# Patient Record
Sex: FEMALE | Race: OTHER | HISPANIC OR LATINO | URBAN - METROPOLITAN AREA
[De-identification: names, ages, dates, MRNs, and addresses within clinical notes are randomized per-mention and may not be internally consistent; named-entity substitution may affect disease eponyms.]

---

## 2017-11-25 ENCOUNTER — EMERGENCY (EMERGENCY)
Facility: HOSPITAL | Age: 27
LOS: 1 days | Discharge: HOME | End: 2017-11-25
Attending: EMERGENCY MEDICINE
Payer: MEDICAID

## 2017-11-25 VITALS
RESPIRATION RATE: 16 BRPM | DIASTOLIC BLOOD PRESSURE: 74 MMHG | WEIGHT: 113.1 LBS | TEMPERATURE: 99 F | HEIGHT: 62.6 IN | OXYGEN SATURATION: 99 % | HEART RATE: 84 BPM | SYSTOLIC BLOOD PRESSURE: 129 MMHG

## 2017-11-25 PROCEDURE — 99283 EMERGENCY DEPT VISIT LOW MDM: CPT

## 2017-11-25 PROCEDURE — T1013: CPT

## 2017-11-25 RX ORDER — VALACYCLOVIR 500 MG/1
1 TABLET, FILM COATED ORAL
Qty: 21 | Refills: 0
Start: 2017-11-25 | End: 2017-12-02

## 2017-11-25 RX ORDER — PREDNISOLONE 5 MG
1 TABLET ORAL
Qty: 7 | Refills: 0
Start: 2017-11-25 | End: 2017-12-02

## 2017-11-25 NOTE — ED ADULT TRIAGE NOTE - CHIEF COMPLAINT QUOTE
PAtient reports insect bite on back 2 days ago. Worsening and now another irritated patch is on chest.

## 2017-11-25 NOTE — ED STATDOCS - OBJECTIVE STATEMENT
28 y/o F pt presents to the ED with c/o pustular rash on chest and back. Pt is also experiencing itchiness and says its progressively worsening. Denies pain, fever, chills. No further complaints at this time.       : Carmela

## 2018-09-11 PROBLEM — Z00.00 ENCOUNTER FOR PREVENTIVE HEALTH EXAMINATION: Status: ACTIVE | Noted: 2018-09-11

## 2018-09-27 ENCOUNTER — OTHER (OUTPATIENT)
Age: 28
End: 2018-09-27

## 2018-10-11 ENCOUNTER — APPOINTMENT (OUTPATIENT)
Dept: ORTHOPEDIC SURGERY | Facility: CLINIC | Age: 28
End: 2018-10-11
Payer: MEDICAID

## 2018-10-11 VITALS
HEIGHT: 62 IN | DIASTOLIC BLOOD PRESSURE: 80 MMHG | HEART RATE: 65 BPM | SYSTOLIC BLOOD PRESSURE: 118 MMHG | WEIGHT: 120 LBS | BODY MASS INDEX: 22.08 KG/M2

## 2018-10-11 DIAGNOSIS — Z80.1 FAMILY HISTORY OF MALIGNANT NEOPLASM OF TRACHEA, BRONCHUS AND LUNG: ICD-10-CM

## 2018-10-11 PROCEDURE — 99203 OFFICE O/P NEW LOW 30 MIN: CPT

## 2018-10-11 PROCEDURE — 73564 X-RAY EXAM KNEE 4 OR MORE: CPT | Mod: RT

## 2018-10-19 ENCOUNTER — APPOINTMENT (OUTPATIENT)
Dept: ORTHOPEDIC SURGERY | Facility: CLINIC | Age: 28
End: 2018-10-19

## 2019-10-03 ENCOUNTER — OTHER (OUTPATIENT)
Age: 29
End: 2019-10-03

## 2019-10-08 ENCOUNTER — APPOINTMENT (OUTPATIENT)
Dept: ORTHOPEDIC SURGERY | Facility: CLINIC | Age: 29
End: 2019-10-08
Payer: COMMERCIAL

## 2019-10-08 VITALS
HEART RATE: 71 BPM | HEIGHT: 62 IN | DIASTOLIC BLOOD PRESSURE: 71 MMHG | WEIGHT: 120 LBS | SYSTOLIC BLOOD PRESSURE: 112 MMHG | BODY MASS INDEX: 22.08 KG/M2

## 2019-10-08 PROCEDURE — 73564 X-RAY EXAM KNEE 4 OR MORE: CPT

## 2019-10-08 PROCEDURE — 99213 OFFICE O/P EST LOW 20 MIN: CPT

## 2019-10-08 NOTE — PHYSICAL EXAM
[de-identified] : The patient appears well nourished  and in no apparent distress.  The patient is alert and oriented to person, place, and time.   Affect and mood appear normal. The head is normocephalic and atraumatic.  The eyes reveal normal sclera and extra ocular muscles are intact. The tongue is midline with no apparent lesions.  Skin shows normal turgor with no evidence of eczema or psoriasis.  No respiratory distress noted.  Sensation grossly intact.\par   [de-identified] : Xray- 4 views of the right knee shows patella juan. [de-identified] : Exam of the right knee shows a positive J sign with patella subluxation when actively extending the knee.  5/5 motor strength bilaterally distally. Sensation intact distally.

## 2019-10-08 NOTE — REASON FOR VISIT
[Follow-Up Visit] : a follow-up visit for [Other: ____] : [unfilled] [[employer] ] : provided by [employer]   [TWNoteComboBox1] : [place]er

## 2019-10-08 NOTE — DISCUSSION/SUMMARY
[de-identified] : The patient is a 29 year old female with recurrent patella instability and patella juan on radiographs.  I think she might benefit from patellar realignment surgery and she was referred to my partner to discuss this treatment option further.

## 2019-10-08 NOTE — HISTORY OF PRESENT ILLNESS
[de-identified] : The patient is a 29 year old female being seen for evaluation of her right knee. Last seen in the office in October of last year at which time she was referred to a sport surgeon for evaluation of her right knee for patella instability. She reports seeing another doctor to discuss surgery options, but not having it covered by insurance. She reports feeling of subluxation of the right knee patella 2-3 times a week. She is ambulating and transferring well, but reports feelings of instability. She reports difficulty with stair-climbing and activity. She comes in today for repeat evaluation of the right knee.

## 2019-10-08 NOTE — ADDENDUM
[FreeTextEntry1] : This note was authored by Geovanny Resendiz working as a medical scribe for Dr. Gabriel Moctezuma. The note was reviewed, edited, and revised by Dr. Gabriel Moctezuma whom is in agreement with the exam findings, imaging findings, and treatment plan. 10/08/2019.

## 2019-10-22 ENCOUNTER — APPOINTMENT (OUTPATIENT)
Dept: ORTHOPEDIC SURGERY | Facility: CLINIC | Age: 29
End: 2019-10-22
Payer: COMMERCIAL

## 2019-10-22 ENCOUNTER — TRANSCRIPTION ENCOUNTER (OUTPATIENT)
Age: 29
End: 2019-10-22

## 2019-10-22 VITALS
HEART RATE: 79 BPM | WEIGHT: 120 LBS | BODY MASS INDEX: 22.08 KG/M2 | HEIGHT: 62 IN | DIASTOLIC BLOOD PRESSURE: 80 MMHG | SYSTOLIC BLOOD PRESSURE: 122 MMHG

## 2019-10-22 PROCEDURE — 73562 X-RAY EXAM OF KNEE 3: CPT | Mod: RT

## 2019-10-22 PROCEDURE — 99213 OFFICE O/P EST LOW 20 MIN: CPT

## 2019-10-31 NOTE — REASON FOR VISIT
[Initial Visit] : an initial visit for [[employer] ] : provided by [employer]   [FreeTextEntry1] : [id number] [FreeTextEntry2] : [place] [TWNoteComboBox1] : Afghan

## 2019-10-31 NOTE — DISCUSSION/SUMMARY
[de-identified] : Sherice is a 29-year-old female with right knee pain as well as patellar instability likely secondary to significant underlying problems. She has a positive J. sign as well as chronic instability of the knee over the past 20 years. 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 On the next visit I will also get full leg length x-rays to assess her mechanical and anatomic alignment. This is all necessary for preoperative planning and she will need possible staged procedures and possible combined proximal and distal realignment procedures. She agrees with the above plan and all questions were answered.

## 2019-10-31 NOTE — PHYSICAL EXAM
[de-identified] : Physical Exam:\par General: Well appearing, no acute distress, A&O\par Neurologic: A&Ox3, No focal deficits\par Head: NCAT without abrasions, lacerations, or ecchymosis to head, face, or scalp\par Eyes: No scleral icterus, no gross abnormalities\par Respiratory: Equal chest wall expansion bilaterally, no accessory muscle use\par Lymphatic: No lymphadenopathy palpated\par Skin: Warm and dry\par Psychiatric: Normal mood and affect\par Left knee\par \par Inspection/Palpation: There is no inguinal adenopathy. Well perfused, without skin lesions, shows a grossly normal motor and sensory examination. \par ROM: Normal\par Muscle/Nerves: Quad strength decreased secondary to injury. Motor exam 5/ distally, EHL/FHL/GSC/TA\par SILT L4-S1\par Special Tests: \par No Joint line tenderness noted  at medial and lateral joint line at 0 and 90 degrees. \par Stable in varus and valgus stress at 0 and 30 degrees\par Negative posterior drawer. \par Negative anterior drawer and stable Lachman \par Normal hip and ankle exam.\par \par \par Right Knee\par \par Inspection/Palpation: There is no inguinal adenopathy. Well perfused, without skin lesions, shows a grossly normal motor and sensory examination. Patella sitting laterally with full extension. Positive J. sign. Positive patellar apprehension. Increased patellar mobility.\par ROM: Normal\par Muscle/Nerves: Quad strength decreased secondary to injury. Motor exam 5/ distally, EHL/FHL/GSC/TA\par SILT L4-S1\par Special Tests: \par No Joint line tenderness noted  at medial and lateral joint line at 0 and 90 degrees. \par Stable in varus and valgus stress at 0 and 30 degrees\par Negative posterior drawer. \par Negative anterior drawer and stable Lachman \par Normal hip and ankle exam.\par  [de-identified] : MRI performed at outside was unavailable for me to review. I wasn't able to redo her notes nor is he reports or leg images. 4 views of the right knee were performed today and available for me to review. They demonstrate a laterally subluxed patella with full extension and a concentric patella in the trochlear groove with flexion. Minimal joint space narrowing noted to

## 2019-10-31 NOTE — HISTORY OF PRESENT ILLNESS
[2] : a current pain level of 2/10 [___ days] : [unfilled] day(s) ago [1] : an average pain level of 1/10 [None] : No exacerbating factors are noted [de-identified] : JERROD is a 29 year old female who presents today for evaluation of right patellar dislocations.  Her first dislocation occurred approximately 20 years ago and it now dislocates 2x/month.  She performed PT for one year several years ago which helped slightly.  MRI was obtained from [facility] but she does not have the images with her today.  She saw Dr. Moctezuma who referred her here for further evaluation.\par \par Currently, she denies pain to her knee or LE.  When her patella dislocates, she endorses pain inferior and medial to her patella.  She is able to reduce her patella on her own.  She denies numbness or tingling to the LE.

## 2019-10-31 NOTE — ADDENDUM
[FreeTextEntry1] : Regarding pts knee dislocations she has had them for over 20 years. 12 years ago she performed PT for 1 1/2 years of which gave her no relief. Over the last 8 months she has performed HEP daily with use of knee brace during all activities. She admits to over the last 11 years 1 knee dislocation per month. Over the last year it has increased to 2 or more dislocations per month with simple activities of daily living. She has stopped all sports/ recreational activities due to this and would like to get back to her normal activities. She admits to an MRI through [facility] in [place] of which she has yet to obtain results from. Educated pt we need the disc and report from this study and we will further require a CT scan for pre-operative planning to evaluate the bony alignment within her knee. MRI for cartilage and MPFL evaluation. Spoke with pts insurance who approved a new MRI to right knee already since other was old and prior to other multiple dislocations since then (approval #: [id number]) and discussed with Dr. Jones for approval of CT scan to right knee which he approved today (approval #: [id number]). Educated pt by  phone that we would like her to perform these studies, make sure we have results and then see us to discuss them. ( #: [id number], Macedonian). She agrees with the above plan and all questions were answered. VIDAL

## 2019-11-01 ENCOUNTER — FORM ENCOUNTER (OUTPATIENT)
Age: 29
End: 2019-11-01

## 2019-11-02 ENCOUNTER — APPOINTMENT (OUTPATIENT)
Dept: CT IMAGING | Facility: CLINIC | Age: 29
End: 2019-11-02

## 2019-11-02 ENCOUNTER — APPOINTMENT (OUTPATIENT)
Dept: MRI IMAGING | Facility: CLINIC | Age: 29
End: 2019-11-02

## 2019-11-02 ENCOUNTER — OUTPATIENT (OUTPATIENT)
Dept: OUTPATIENT SERVICES | Facility: HOSPITAL | Age: 29
LOS: 1 days | End: 2019-11-02
Payer: COMMERCIAL

## 2019-11-02 DIAGNOSIS — M25.569 PAIN IN UNSPECIFIED KNEE: ICD-10-CM

## 2019-11-02 DIAGNOSIS — S83.001A UNSPECIFIED SUBLUXATION OF RIGHT PATELLA, INITIAL ENCOUNTER: ICD-10-CM

## 2019-11-02 DIAGNOSIS — Z00.00 ENCOUNTER FOR GENERAL ADULT MEDICAL EXAMINATION WITHOUT ABNORMAL FINDINGS: ICD-10-CM

## 2019-11-02 PROCEDURE — 73700 CT LOWER EXTREMITY W/O DYE: CPT

## 2019-11-02 PROCEDURE — 73721 MRI JNT OF LWR EXTRE W/O DYE: CPT

## 2019-11-02 PROCEDURE — 73700 CT LOWER EXTREMITY W/O DYE: CPT | Mod: 26,RT

## 2019-11-02 PROCEDURE — 73721 MRI JNT OF LWR EXTRE W/O DYE: CPT | Mod: 26,RT

## 2019-12-23 ENCOUNTER — APPOINTMENT (OUTPATIENT)
Dept: ORTHOPEDIC SURGERY | Facility: CLINIC | Age: 29
End: 2019-12-23
Payer: COMMERCIAL

## 2019-12-23 VITALS
HEIGHT: 62 IN | WEIGHT: 120 LBS | BODY MASS INDEX: 22.08 KG/M2 | HEART RATE: 62 BPM | SYSTOLIC BLOOD PRESSURE: 114 MMHG | DIASTOLIC BLOOD PRESSURE: 75 MMHG

## 2019-12-23 PROCEDURE — 99213 OFFICE O/P EST LOW 20 MIN: CPT

## 2019-12-23 PROCEDURE — 77073 BONE LENGTH STUDIES: CPT

## 2019-12-23 NOTE — DISCUSSION/SUMMARY
[de-identified] : Sherice is a 29-year-old female with right knee pain secondary to chronic recurrent patella dislocation.  We reviewed the MRI and the CT scan today and discussed with her all options.  We discussed nonoperative care with continued patella stabilizing brace and PT.  We discussed operative intervention which may necessitate proximal versus distal versus combined realignment procedures.  I reviewed her leg length x-rays today as well which appeared to be normal.  We discussed success rates of proximal and distal realignment.  Her TT TG and her TT PCL are both within normal limits although she does have patella juan, cartilage damage underneath the patella, and a laterally subluxated patella.  Given all of her anatomic factors I do recommend distal realignment with a possible combined proximal realignment.  She would like to go home to think about proximal versus distal realignment.  I gave her a packet that discusses the pros and cons as well as the recovery time for both.  I will see her back in 2 weeks to further discuss surgical options.  She agrees with the above plan and all questions were answered.

## 2019-12-23 NOTE — HISTORY OF PRESENT ILLNESS
[___ days] : [unfilled] day(s) ago [2] : a current pain level of 2/10 [1] : a maximum pain level of 1/10 [None] : No relieving factors are noted [de-identified] : JERROD is a 29 year old female who presents today for evaluation of right patellar dislocations.  Her first dislocation occurred approximately 20 years ago and it now dislocates 2x/month.  She performed PT for one year several years ago which helped slightly.  MRI was obtained from [facility] but she does not have the images with her today.  She saw Dr. Moctezuma who referred her here for further evaluation.\par \par Currently, she denies pain to her knee or LE. She reports 2 patella dislocations within the last 3 weeks.  The knee brace helps, but it does occasionally dislocate while wearing the brace.  She obtained a CT scan and MRI. \par \par CT reveals: \par Findings consistent with chronic transient patellar dislocation with lateral subluxation of the patella, \par distal aspect trochlea, attenuated medial retinaculum and old fracture deformity of the inferior \par medial patella with healing.\par \par MRI reveals:\par Findings consistent with chronic transient patellar dislocation with chronically laterally subluxed \par patella, chronic attenuation and tearing of the medial retinaculum, and high-grade cartilage loss at \par the median patellar eminence extending to the medial patellar facet.\par

## 2019-12-23 NOTE — REASON FOR VISIT
[Follow-Up Visit] : a follow-up visit for [[employer] ] : provided by [employer]   [FreeTextEntry1] : [id number] [FreeTextEntry2] : Julian [TWNoteComboBox1] : Uzbek

## 2019-12-23 NOTE — PHYSICAL EXAM
[de-identified] : Physical Exam:\par General: Well appearing, no acute distress, A&O\par Neurologic: A&Ox3, No focal deficits\par Head: NCAT without abrasions, lacerations, or ecchymosis to head, face, or scalp\par Eyes: No scleral icterus, no gross abnormalities\par Respiratory: Equal chest wall expansion bilaterally, no accessory muscle use\par Lymphatic: No lymphadenopathy palpated\par Skin: Warm and dry\par Psychiatric: Normal mood and affect\par Left knee\par \par Inspection/Palpation: There is no inguinal adenopathy. Well perfused, without skin lesions, shows a grossly normal motor and sensory examination. \par ROM: Normal\par Muscle/Nerves: Quad strength decreased secondary to injury. Motor exam 5/ distally, EHL/FHL/GSC/TA\par SILT L4-S1\par Special Tests: \par No Joint line tenderness noted  at medial and lateral joint line at 0 and 90 degrees. \par Stable in varus and valgus stress at 0 and 30 degrees\par Negative posterior drawer. \par Negative anterior drawer and stable Lachman \par Normal hip and ankle exam.\par \par \par Right Knee\par \par Inspection/Palpation: There is no inguinal adenopathy. Well perfused, without skin lesions, shows a grossly normal motor and sensory examination. Patella sitting laterally with full extension. Positive J. sign. Positive patellar apprehension. Increased patellar mobility.\par ROM: Normal\par Muscle/Nerves: Quad strength decreased secondary to injury. Motor exam 5/ distally, EHL/FHL/GSC/TA\par SILT L4-S1\par Special Tests: \par No Joint line tenderness noted  at medial and lateral joint line at 0 and 90 degrees. \par Stable in varus and valgus stress at 0 and 30 degrees\par Negative posterior drawer. \par Negative anterior drawer and stable Lachman \par Normal hip and ankle exam.\par  [de-identified] : \par \par MRI and CT scan were performed at a [place] facility and the images and the report were available for me to review.\par MRI: \par Findings consistent with chronic transient patellar dislocation with chronically laterally subluxed \par patella, chronic attenuation and tearing of the medial retinaculum, and high-grade cartilage loss at \par the median patellar eminence extending to the medial patellar facet.TT-PCL measured 22\par \par CT: \par Findings consistent with chronic transient patellar dislocation with lateral subluxation of the patella, \par distal aspect trochlea, attenuated medial retinaculum and old fracture deformity of the inferior \par medial patella with healing. TT-TG measured 16\par \par Full leg length x-rays performed today and available for me to review.  They demonstrate a normal Q angle.

## 2020-01-06 ENCOUNTER — APPOINTMENT (OUTPATIENT)
Dept: ORTHOPEDIC SURGERY | Facility: CLINIC | Age: 30
End: 2020-01-06
Payer: COMMERCIAL

## 2020-01-06 VITALS
DIASTOLIC BLOOD PRESSURE: 73 MMHG | BODY MASS INDEX: 22.08 KG/M2 | SYSTOLIC BLOOD PRESSURE: 111 MMHG | HEART RATE: 54 BPM | HEIGHT: 62 IN | WEIGHT: 120 LBS

## 2020-01-06 DIAGNOSIS — M25.569 PAIN IN UNSPECIFIED KNEE: ICD-10-CM

## 2020-01-06 PROCEDURE — 99213 OFFICE O/P EST LOW 20 MIN: CPT

## 2020-01-06 NOTE — DISCUSSION/SUMMARY
[de-identified] : He comes in for follow-up today and to discuss surgical options.  I discussed with her in the last visit proximal versus distal realignment procedures.  After thorough discussion with the patient she elects for a proximal realignment.  I discussed with her the risks and benefits of proximal versus distal as well as the pros and cons.  I explained to her that a proximal realignment may not solve all of her issues and may potentially lead to a secondary surgery with a distal realignment.  She understands those risks and is willing to take them.  All risks, benefits and alternatives to the proposed surgical procedure, right knee arthroscopy with MPFL reconstruction with allograft, were discussed in great detail with the patient. Risks include but are not limited to pain, bleeding, infection, stiffness, [default value], medical complications (including DVT, PE, MI), and risks of anesthesia. The patient expressed understanding and all questions were answered. The patient is electing to proceed, and will have the patient scheduled accordingly.

## 2020-01-06 NOTE — HISTORY OF PRESENT ILLNESS
[2] : a current pain level of 2/10 [___ days] : [unfilled] day(s) ago [1] : a maximum pain level of 1/10 [None] : No relieving factors are noted [de-identified] : JERROD is a 29 year old female who presents today for evaluation of right patellar dislocations.  Her first dislocation occurred approximately 20 years ago and it now dislocates 2x/month.  She performed PT for one year several years ago which helped slightly.  MRI was obtained from [facility] but she does not have the images with her today.  She saw Dr. Moctezuma who referred her here for further evaluation.\par \par The knee brace helps, but it does occasionally dislocate while wearing the brace.  She obtained a CT scan and MRI. \par \par CT reveals: \par Findings consistent with chronic transient patellar dislocation with lateral subluxation of the patella, \par distal aspect trochlea, attenuated medial retinaculum and old fracture deformity of the inferior \par medial patella with healing.\par \par MRI reveals:\par Findings consistent with chronic transient patellar dislocation with chronically laterally subluxed \par patella, chronic attenuation and tearing of the medial retinaculum, and high-grade cartilage loss at \par the median patellar eminence extending to the medial patellar facet.\par

## 2020-01-06 NOTE — REASON FOR VISIT
[Follow-Up Visit] : a follow-up visit for [[employer] ] : provided by [employer]   [FreeTextEntry1] : [id number] [FreeTextEntry2] : Vani [TWNoteComboBox1] : Ghanaian

## 2020-01-06 NOTE — PHYSICAL EXAM
[de-identified] : Physical Exam:\par General: Well appearing, no acute distress, A&O\par Neurologic: A&Ox3, No focal deficits\par Head: NCAT without abrasions, lacerations, or ecchymosis to head, face, or scalp\par Eyes: No scleral icterus, no gross abnormalities\par Respiratory: Equal chest wall expansion bilaterally, no accessory muscle use\par Lymphatic: No lymphadenopathy palpated\par Skin: Warm and dry\par Psychiatric: Normal mood and affect\par Left knee\par \par Inspection/Palpation: There is no inguinal adenopathy. Well perfused, without skin lesions, shows a grossly normal motor and sensory examination. \par ROM: Normal\par Muscle/Nerves: Quad strength decreased secondary to injury. Motor exam 5/ distally, EHL/FHL/GSC/TA\par SILT L4-S1\par Special Tests: \par No Joint line tenderness noted  at medial and lateral joint line at 0 and 90 degrees. \par Stable in varus and valgus stress at 0 and 30 degrees\par Negative posterior drawer. \par Negative anterior drawer and stable Lachman \par Normal hip and ankle exam.\par \par \par Right Knee\par \par Inspection/Palpation: There is no inguinal adenopathy. Well perfused, without skin lesions, shows a grossly normal motor and sensory examination. Patella sitting laterally with full extension. Positive J. sign. Positive patellar apprehension. Increased patellar mobility.\par ROM: Normal\par Muscle/Nerves: Quad strength decreased secondary to injury. Motor exam 5/ distally, EHL/FHL/GSC/TA\par SILT L4-S1\par Special Tests: \par No Joint line tenderness noted  at medial and lateral joint line at 0 and 90 degrees. \par Stable in varus and valgus stress at 0 and 30 degrees\par Negative posterior drawer. \par Negative anterior drawer and stable Lachman \par Normal hip and ankle exam.\par  [de-identified] : \par \par MRI and CT scan were performed at a [place] facility and the images and the report were available for me to review.\par MRI: \par Findings consistent with chronic transient patellar dislocation with chronically laterally subluxed \par patella, chronic attenuation and tearing of the medial retinaculum, and high-grade cartilage loss at \par the median patellar eminence extending to the medial patellar facet.TT-PCL measured 22\par \par CT: \par Findings consistent with chronic transient patellar dislocation with lateral subluxation of the patella, \par distal aspect trochlea, attenuated medial retinaculum and old fracture deformity of the inferior \par medial patella with healing. TT-TG measured 16\par \par Full leg length x-rays performed today and available for me to review.  They demonstrate a normal Q angle.

## 2020-04-01 ENCOUNTER — APPOINTMENT (OUTPATIENT)
Dept: ORTHOPEDIC SURGERY | Facility: HOSPITAL | Age: 30
End: 2020-04-01

## 2020-07-23 ENCOUNTER — OUTPATIENT (OUTPATIENT)
Dept: OUTPATIENT SERVICES | Facility: HOSPITAL | Age: 30
LOS: 1 days | End: 2020-07-23
Payer: COMMERCIAL

## 2020-07-23 VITALS
HEART RATE: 69 BPM | SYSTOLIC BLOOD PRESSURE: 120 MMHG | RESPIRATION RATE: 16 BRPM | HEIGHT: 61.5 IN | OXYGEN SATURATION: 100 % | WEIGHT: 128.09 LBS | TEMPERATURE: 98 F | DIASTOLIC BLOOD PRESSURE: 55 MMHG

## 2020-07-23 DIAGNOSIS — Z90.89 ACQUIRED ABSENCE OF OTHER ORGANS: Chronic | ICD-10-CM

## 2020-07-23 DIAGNOSIS — M25.569 PAIN IN UNSPECIFIED KNEE: ICD-10-CM

## 2020-07-23 LAB
ANION GAP SERPL CALC-SCNC: 8 MMOL/L — SIGNIFICANT CHANGE UP (ref 5–17)
APTT BLD: 39 SEC — HIGH (ref 27.5–35.5)
BASOPHILS # BLD AUTO: 0.02 K/UL — SIGNIFICANT CHANGE UP (ref 0–0.2)
BASOPHILS NFR BLD AUTO: 0.2 % — SIGNIFICANT CHANGE UP (ref 0–2)
BUN SERPL-MCNC: 15 MG/DL — SIGNIFICANT CHANGE UP (ref 7–23)
CALCIUM SERPL-MCNC: 9.4 MG/DL — SIGNIFICANT CHANGE UP (ref 8.5–10.1)
CHLORIDE SERPL-SCNC: 102 MMOL/L — SIGNIFICANT CHANGE UP (ref 96–108)
CO2 SERPL-SCNC: 28 MMOL/L — SIGNIFICANT CHANGE UP (ref 22–31)
CREAT SERPL-MCNC: 0.77 MG/DL — SIGNIFICANT CHANGE UP (ref 0.5–1.3)
EOSINOPHIL # BLD AUTO: 0.09 K/UL — SIGNIFICANT CHANGE UP (ref 0–0.5)
EOSINOPHIL NFR BLD AUTO: 1.1 % — SIGNIFICANT CHANGE UP (ref 0–6)
GLUCOSE SERPL-MCNC: 111 MG/DL — HIGH (ref 70–99)
HCT VFR BLD CALC: 43 % — SIGNIFICANT CHANGE UP (ref 34.5–45)
HGB BLD-MCNC: 14.1 G/DL — SIGNIFICANT CHANGE UP (ref 11.5–15.5)
IMM GRANULOCYTES NFR BLD AUTO: 0.1 % — SIGNIFICANT CHANGE UP (ref 0–1.5)
INR BLD: 1.11 RATIO — SIGNIFICANT CHANGE UP (ref 0.88–1.16)
LYMPHOCYTES # BLD AUTO: 1.87 K/UL — SIGNIFICANT CHANGE UP (ref 1–3.3)
LYMPHOCYTES # BLD AUTO: 22.6 % — SIGNIFICANT CHANGE UP (ref 13–44)
MCHC RBC-ENTMCNC: 30 PG — SIGNIFICANT CHANGE UP (ref 27–34)
MCHC RBC-ENTMCNC: 32.8 GM/DL — SIGNIFICANT CHANGE UP (ref 32–36)
MCV RBC AUTO: 91.5 FL — SIGNIFICANT CHANGE UP (ref 80–100)
MONOCYTES # BLD AUTO: 0.46 K/UL — SIGNIFICANT CHANGE UP (ref 0–0.9)
MONOCYTES NFR BLD AUTO: 5.6 % — SIGNIFICANT CHANGE UP (ref 2–14)
NEUTROPHILS # BLD AUTO: 5.83 K/UL — SIGNIFICANT CHANGE UP (ref 1.8–7.4)
NEUTROPHILS NFR BLD AUTO: 70.4 % — SIGNIFICANT CHANGE UP (ref 43–77)
PLATELET # BLD AUTO: 310 K/UL — SIGNIFICANT CHANGE UP (ref 150–400)
POTASSIUM SERPL-MCNC: 3.2 MMOL/L — LOW (ref 3.5–5.3)
POTASSIUM SERPL-SCNC: 3.2 MMOL/L — LOW (ref 3.5–5.3)
PROTHROM AB SERPL-ACNC: 12.9 SEC — SIGNIFICANT CHANGE UP (ref 10.6–13.6)
RBC # BLD: 4.7 M/UL — SIGNIFICANT CHANGE UP (ref 3.8–5.2)
RBC # FLD: 11.9 % — SIGNIFICANT CHANGE UP (ref 10.3–14.5)
SODIUM SERPL-SCNC: 138 MMOL/L — SIGNIFICANT CHANGE UP (ref 135–145)
WBC # BLD: 8.28 K/UL — SIGNIFICANT CHANGE UP (ref 3.8–10.5)
WBC # FLD AUTO: 8.28 K/UL — SIGNIFICANT CHANGE UP (ref 3.8–10.5)

## 2020-07-23 PROCEDURE — 93010 ELECTROCARDIOGRAM REPORT: CPT

## 2020-07-23 PROCEDURE — G0463: CPT | Mod: 25

## 2020-07-23 PROCEDURE — 93005 ELECTROCARDIOGRAM TRACING: CPT

## 2020-07-23 PROCEDURE — 85610 PROTHROMBIN TIME: CPT

## 2020-07-23 PROCEDURE — 85730 THROMBOPLASTIN TIME PARTIAL: CPT

## 2020-07-23 PROCEDURE — 36415 COLL VENOUS BLD VENIPUNCTURE: CPT

## 2020-07-23 PROCEDURE — 80048 BASIC METABOLIC PNL TOTAL CA: CPT

## 2020-07-23 PROCEDURE — 85025 COMPLETE CBC W/AUTO DIFF WBC: CPT

## 2020-07-23 NOTE — H&P PST ADULT - ASSESSMENT
29 y.o female scheduled for 29 y.o female scheduled for Right Knee Arthroscopy, Medial Patella Femoral Ligament Reconstruction with Allograft, Tibial Tubercle Osteotomy   Plan  1. Stop all NSAIDS, herbal supplements and vitamins for 7 days.  2. NPO at midnight.  3. Take the following medications--none-- with small sips of water on the morning of your procedure/surgery.  4. Use EZ sponges as directed  5. Labs, EKG as per surgeon  6. COVID swab appointment 7/26/2020

## 2020-07-23 NOTE — H&P PST ADULT - NSANTHOSAYNRD_GEN_A_CORE
No. MIRANDA screening performed.  STOP BANG Legend: 0-2 = LOW Risk; 3-4 = INTERMEDIATE Risk; 5-8 = HIGH Risk

## 2020-07-23 NOTE — H&P PST ADULT - HISTORY OF PRESENT ILLNESS
29 y.o WD,WN female presents for PST with hx 29 y.o WD,WN female presents for PST with hx of right knee pain. Patient states that she has hx of right knee " moving out of place" sporadically  for over 20yrs. 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 She lived with this issue until recently she reports knee dislocating at least once a month. She has followed with ortho and now opting for surgical intervention. She is scheduled for Right Knee Arthroscopy, Medial Patella Femoral Ligament Reconstruction with Allograft, Tibial Tubercle Osteotomy

## 2020-07-23 NOTE — H&P PST ADULT - LANGUAGE ASSISTANCE NEEDED
No-Patient/Caregiver offered and refused free interpretation services./Patient can speak English chose not to use

## 2020-07-23 NOTE — H&P PST ADULT - TEACHING/LEARNING LEARNING PREFERENCES
audio/computer/internet/written material/group instruction/video/skill demonstration/individual instruction/pictorial/verbal instruction

## 2020-07-24 DIAGNOSIS — M25.569 PAIN IN UNSPECIFIED KNEE: ICD-10-CM

## 2020-07-24 PROBLEM — S83.001A: Chronic | Status: ACTIVE | Noted: 2020-07-23

## 2020-07-26 ENCOUNTER — OUTPATIENT (OUTPATIENT)
Dept: OUTPATIENT SERVICES | Facility: HOSPITAL | Age: 30
LOS: 1 days | End: 2020-07-26
Payer: COMMERCIAL

## 2020-07-26 DIAGNOSIS — Z90.89 ACQUIRED ABSENCE OF OTHER ORGANS: Chronic | ICD-10-CM

## 2020-07-26 DIAGNOSIS — Z11.59 ENCOUNTER FOR SCREENING FOR OTHER VIRAL DISEASES: ICD-10-CM

## 2020-07-26 PROCEDURE — U0003: CPT

## 2020-07-27 DIAGNOSIS — Z11.59 ENCOUNTER FOR SCREENING FOR OTHER VIRAL DISEASES: ICD-10-CM

## 2020-07-27 LAB — SARS-COV-2 RNA SPEC QL NAA+PROBE: DETECTED

## 2020-08-26 ENCOUNTER — APPOINTMENT (OUTPATIENT)
Dept: ORTHOPEDIC SURGERY | Facility: CLINIC | Age: 30
End: 2020-08-26
Payer: COMMERCIAL

## 2020-08-26 PROCEDURE — 99214 OFFICE O/P EST MOD 30 MIN: CPT

## 2020-08-26 NOTE — DISCUSSION/SUMMARY
[de-identified] : Sherice comes in for follow-up today and to discuss surgical options.  I discussed with her in the last visit proximal versus distal realignment procedures.  After thorough discussion with the patient she elects for a proximal realignment.  I discussed with her the risks and benefits of proximal versus distal as well as the pros and cons.  I explained to her that a proximal realignment may not solve all of her issues and may potentially lead to a secondary surgery with a distal realignment.  She understands those risks and is willing to take them.  All risks, benefits and alternatives to the proposed surgical procedure, right knee arthroscopy with tibial tubercle osteotomy,  MPFL reconstruction with allograft, were discussed in great detail with the patient. Risks include but are not limited to pain, bleeding, infection, stiffness, [default value], medical complications (including DVT, PE, MI), and risks of anesthesia. The patient expressed understanding and all questions were answered. The patient is electing to proceed, and will have the patient scheduled accordingly. She agrees with the above plan and all questions were answered.

## 2020-08-26 NOTE — REASON FOR VISIT
[[employer] ] : provided by [employer]   [Follow-Up Visit] : a follow-up visit for [TWNoteComboBox1] : [place] Islander [FreeTextEntry2] : Miguelina

## 2020-08-26 NOTE — HISTORY OF PRESENT ILLNESS
[de-identified] : JERROD is a 29 year old female who presents today for evaluation of right patellar dislocations.  Her first dislocation occurred approximately 20 years ago and it now dislocates 2x/month.  She performed PT for one year several years ago which helped slightly.  MRI was obtained from [facility] but she does not have the images with her today.  She saw Dr. Moctezuma who referred her here for further evaluation.\par \par The knee brace helps, but it does occasionally dislocate while wearing the brace.  She obtained a CT scan and MRI. \par \par CT reveals: \par Findings consistent with chronic transient patellar dislocation with lateral subluxation of the patella, \par distal aspect trochlea, attenuated medial retinaculum and old fracture deformity of the inferior \par medial patella with healing.\par \par MRI reveals:\par Findings consistent with chronic transient patellar dislocation with chronically laterally subluxed \par patella, chronic attenuation and tearing of the medial retinaculum, and high-grade cartilage loss at \par the median patellar eminence extending to the medial patellar facet.\par

## 2020-08-26 NOTE — PHYSICAL EXAM
[de-identified] : Physical Exam:\par General: Well appearing, no acute distress, A&O\par Neurologic: A&Ox3, No focal deficits\par Head: NCAT without abrasions, lacerations, or ecchymosis to head, face, or scalp\par Eyes: No scleral icterus, no gross abnormalities\par Respiratory: Equal chest wall expansion bilaterally, no accessory muscle use\par Lymphatic: No lymphadenopathy palpated\par Skin: Warm and dry\par Psychiatric: Normal mood and affect\par \par Left knee\par \par Inspection/Palpation: There is no inguinal adenopathy. Well perfused, without skin lesions, shows a grossly normal motor and sensory examination. \par ROM: Normal\par Muscle/Nerves: Quad strength decreased secondary to injury. Motor exam 5/ distally, EHL/FHL/GSC/TA\par SILT L4-S1\par Special Tests: \par No Joint line tenderness noted  at medial and lateral joint line at 0 and 90 degrees. \par Stable in varus and valgus stress at 0 and 30 degrees\par Negative posterior drawer. \par Negative anterior drawer and stable Lachman \par Normal hip and ankle exam.\par \par \par Right Knee\par \par Inspection/Palpation: There is no inguinal adenopathy. Well perfused, without skin lesions, shows a grossly normal motor and sensory examination. Patella sitting laterally with full extension. Positive J. sign. Positive patellar apprehension. Increased patellar mobility.\par ROM: Normal\par Muscle/Nerves: Quad strength decreased secondary to injury. Motor exam 5/ distally, EHL/FHL/GSC/TA\par SILT L4-S1\par Special Tests: \par No Joint line tenderness noted  at medial and lateral joint line at 0 and 90 degrees. \par Stable in varus and valgus stress at 0 and 30 degrees\par Negative posterior drawer. 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 TT-TG measured 16\par \par Full leg length x-rays performed today and available for me to review.  They demonstrate a normal Q angle.

## 2020-08-30 ENCOUNTER — OUTPATIENT (OUTPATIENT)
Dept: OUTPATIENT SERVICES | Facility: HOSPITAL | Age: 30
LOS: 1 days | End: 2020-08-30
Payer: COMMERCIAL

## 2020-08-30 DIAGNOSIS — Z90.89 ACQUIRED ABSENCE OF OTHER ORGANS: Chronic | ICD-10-CM

## 2020-08-30 DIAGNOSIS — Z11.59 ENCOUNTER FOR SCREENING FOR OTHER VIRAL DISEASES: ICD-10-CM

## 2020-08-30 PROCEDURE — U0003: CPT

## 2020-08-31 DIAGNOSIS — Z11.59 ENCOUNTER FOR SCREENING FOR OTHER VIRAL DISEASES: ICD-10-CM

## 2020-08-31 LAB — SARS-COV-2 RNA SPEC QL NAA+PROBE: SIGNIFICANT CHANGE UP

## 2020-09-01 RX ORDER — FENTANYL CITRATE 50 UG/ML
50 INJECTION INTRAVENOUS
Refills: 0 | Status: DISCONTINUED | OUTPATIENT
Start: 2020-09-02 | End: 2020-09-02

## 2020-09-01 RX ORDER — SODIUM CHLORIDE 9 MG/ML
1000 INJECTION, SOLUTION INTRAVENOUS
Refills: 0 | Status: DISCONTINUED | OUTPATIENT
Start: 2020-09-02 | End: 2020-09-02

## 2020-09-01 RX ORDER — SODIUM CHLORIDE 9 MG/ML
3 INJECTION INTRAMUSCULAR; INTRAVENOUS; SUBCUTANEOUS EVERY 8 HOURS
Refills: 0 | Status: DISCONTINUED | OUTPATIENT
Start: 2020-09-02 | End: 2020-09-03

## 2020-09-01 RX ORDER — ONDANSETRON 8 MG/1
4 TABLET, FILM COATED ORAL EVERY 6 HOURS
Refills: 0 | Status: DISCONTINUED | OUTPATIENT
Start: 2020-09-02 | End: 2020-09-02

## 2020-09-01 RX ORDER — FAMOTIDINE 10 MG/ML
20 INJECTION INTRAVENOUS ONCE
Refills: 0 | Status: COMPLETED | OUTPATIENT
Start: 2020-09-02 | End: 2020-09-02

## 2020-09-01 RX ORDER — ACETAMINOPHEN 500 MG
975 TABLET ORAL ONCE
Refills: 0 | Status: COMPLETED | OUTPATIENT
Start: 2020-09-02 | End: 2020-09-02

## 2020-09-01 RX ORDER — OXYCODONE HYDROCHLORIDE 5 MG/1
5 TABLET ORAL ONCE
Refills: 0 | Status: DISCONTINUED | OUTPATIENT
Start: 2020-09-02 | End: 2020-09-02

## 2020-09-02 ENCOUNTER — OUTPATIENT (OUTPATIENT)
Dept: INPATIENT UNIT | Facility: HOSPITAL | Age: 30
LOS: 1 days | Discharge: HOME | End: 2020-09-02
Payer: COMMERCIAL

## 2020-09-02 ENCOUNTER — APPOINTMENT (OUTPATIENT)
Dept: ORTHOPEDIC SURGERY | Facility: HOSPITAL | Age: 30
End: 2020-09-02

## 2020-09-02 ENCOUNTER — RESULT REVIEW (OUTPATIENT)
Age: 30
End: 2020-09-02

## 2020-09-02 VITALS
WEIGHT: 128.09 LBS | HEIGHT: 61.5 IN | TEMPERATURE: 98 F | SYSTOLIC BLOOD PRESSURE: 120 MMHG | HEART RATE: 63 BPM | OXYGEN SATURATION: 100 % | DIASTOLIC BLOOD PRESSURE: 75 MMHG | RESPIRATION RATE: 16 BRPM

## 2020-09-02 DIAGNOSIS — M22.41 CHONDROMALACIA PATELLAE, RIGHT KNEE: ICD-10-CM

## 2020-09-02 DIAGNOSIS — M25.569 PAIN IN UNSPECIFIED KNEE: ICD-10-CM

## 2020-09-02 DIAGNOSIS — Z90.89 ACQUIRED ABSENCE OF OTHER ORGANS: Chronic | ICD-10-CM

## 2020-09-02 DIAGNOSIS — M21.961 UNSPECIFIED ACQUIRED DEFORMITY OF RIGHT LOWER LEG: ICD-10-CM

## 2020-09-02 DIAGNOSIS — M22.01 RECURRENT DISLOCATION OF PATELLA, RIGHT KNEE: ICD-10-CM

## 2020-09-02 DIAGNOSIS — M25.361 OTHER INSTABILITY, RIGHT KNEE: ICD-10-CM

## 2020-09-02 LAB — HCG UR QL: NEGATIVE — SIGNIFICANT CHANGE UP

## 2020-09-02 PROCEDURE — 27420 REVISION OF UNSTABLE KNEECAP: CPT | Mod: RT

## 2020-09-02 PROCEDURE — 97162 PT EVAL MOD COMPLEX 30 MIN: CPT | Mod: GP

## 2020-09-02 PROCEDURE — C1889: CPT

## 2020-09-02 PROCEDURE — C1713: CPT

## 2020-09-02 PROCEDURE — 76000 FLUOROSCOPY <1 HR PHYS/QHP: CPT

## 2020-09-02 PROCEDURE — 81025 URINE PREGNANCY TEST: CPT

## 2020-09-02 PROCEDURE — 27418 REPAIR DEGENERATED KNEECAP: CPT | Mod: RT

## 2020-09-02 PROCEDURE — 27425 LAT RETINACULAR RELEASE OPEN: CPT | Mod: 59,RT

## 2020-09-02 PROCEDURE — 97116 GAIT TRAINING THERAPY: CPT | Mod: GP

## 2020-09-02 RX ORDER — OXYCODONE HYDROCHLORIDE 5 MG/1
5 TABLET ORAL EVERY 4 HOURS
Refills: 0 | Status: DISCONTINUED | OUTPATIENT
Start: 2020-09-02 | End: 2020-09-03

## 2020-09-02 RX ORDER — ACETAMINOPHEN 500 MG
975 TABLET ORAL EVERY 8 HOURS
Refills: 0 | Status: DISCONTINUED | OUTPATIENT
Start: 2020-09-02 | End: 2020-09-03

## 2020-09-02 RX ORDER — HYDROMORPHONE HYDROCHLORIDE 2 MG/ML
0.5 INJECTION INTRAMUSCULAR; INTRAVENOUS; SUBCUTANEOUS EVERY 6 HOURS
Refills: 0 | Status: DISCONTINUED | OUTPATIENT
Start: 2020-09-02 | End: 2020-09-03

## 2020-09-02 RX ORDER — ASPIRIN/CALCIUM CARB/MAGNESIUM 324 MG
325 TABLET ORAL DAILY
Refills: 0 | Status: DISCONTINUED | OUTPATIENT
Start: 2020-09-02 | End: 2020-09-03

## 2020-09-02 RX ORDER — OXYCODONE HYDROCHLORIDE 5 MG/1
10 TABLET ORAL EVERY 4 HOURS
Refills: 0 | Status: DISCONTINUED | OUTPATIENT
Start: 2020-09-02 | End: 2020-09-03

## 2020-09-02 RX ORDER — ASPIRIN/CALCIUM CARB/MAGNESIUM 324 MG
1 TABLET ORAL
Qty: 30 | Refills: 0
Start: 2020-09-02 | End: 2020-10-01

## 2020-09-02 RX ORDER — ASCORBIC ACID 60 MG
0 TABLET,CHEWABLE ORAL
Qty: 0 | Refills: 0 | DISCHARGE

## 2020-09-02 RX ORDER — ONDANSETRON 8 MG/1
1 TABLET, FILM COATED ORAL
Qty: 10 | Refills: 0
Start: 2020-09-02 | End: 2020-09-04

## 2020-09-02 RX ADMIN — OXYCODONE HYDROCHLORIDE 5 MILLIGRAM(S): 5 TABLET ORAL at 22:58

## 2020-09-02 RX ADMIN — ONDANSETRON 4 MILLIGRAM(S): 8 TABLET, FILM COATED ORAL at 21:30

## 2020-09-02 RX ADMIN — Medication 975 MILLIGRAM(S): at 14:22

## 2020-09-02 RX ADMIN — FENTANYL CITRATE 50 MICROGRAM(S): 50 INJECTION INTRAVENOUS at 21:40

## 2020-09-02 RX ADMIN — FENTANYL CITRATE 50 MICROGRAM(S): 50 INJECTION INTRAVENOUS at 21:46

## 2020-09-02 RX ADMIN — SODIUM CHLORIDE 3 MILLILITER(S): 9 INJECTION INTRAMUSCULAR; INTRAVENOUS; SUBCUTANEOUS at 21:30

## 2020-09-02 RX ADMIN — FAMOTIDINE 20 MILLIGRAM(S): 10 INJECTION INTRAVENOUS at 14:20

## 2020-09-02 RX ADMIN — OXYCODONE HYDROCHLORIDE 5 MILLIGRAM(S): 5 TABLET ORAL at 23:47

## 2020-09-02 RX ADMIN — Medication 975 MILLIGRAM(S): at 14:20

## 2020-09-02 RX ADMIN — SODIUM CHLORIDE 3 MILLILITER(S): 9 INJECTION INTRAMUSCULAR; INTRAVENOUS; SUBCUTANEOUS at 21:29

## 2020-09-02 NOTE — ASU PATIENT PROFILE, ADULT - TEACHING/LEARNING LEARNING PREFERENCES
audio/computer/internet/group instruction/written material/video/skill demonstration/verbal instruction/individual instruction/pictorial

## 2020-09-02 NOTE — ASU PATIENT PROFILE, ADULT - LANGUAGE ASSISTANCE NEEDED
Yes-Patient/Caregiver accepts free interpretation services.../Patient can speak English chose not to use

## 2020-09-02 NOTE — ASU DISCHARGE PLAN (ADULT/PEDIATRIC) - ASU DC SPECIAL INSTRUCTIONSFT
Post-op Instructions    POST-OP MEDICATIONS:       PAIN: You were given a prescription for narcotic pain medication. Take this medication as needed and as directed for breakthrough pain. You should try Extra Strength Tylenol first (500mg every 4 hours; not to exceed 3,000mg in 24 hours)/anti-inflammatories (such as Motrin) regularly to help control pain.      STOOL SOFTENER: You should take a stool softener while on narcotic pain medication, as these may cause constipation. Demetria-Colace can be purchased over-the-counter and can be taken twice daily     BLOOD CLOT PREVENTION: Anti-coagulation is critical to minimize the risk of a DVT (or blood clot).  We recommend you take Aspirin 325mg once daily as a precaution unless instructed otherwise. Please inform Dr. Berry or Barbara if you are taking birth control, as this increases your risk of DVT.     ICE:  An ice device or ice bag (not directly touching the skin) should be utilized to reduce swelling and pain. Please ice every 3-4 hours for about 15-20 minutes each time until swelling subsides. An ice device is typically not covered by insurance but we do have them available to purchase in our office     BRACE: You have been given a knee brace. This should remain on and locked in extension until you are otherwise directed by the doctor at your post-op visit. It may be removed for dressing, and bathing. This brace is essential to protect your knee while your leg is weak. Brace must be locked in extension for 6 weeks post-op.     AMBULATION: You may weight bear as tolerated after surgery with two crutches unless otherwise instructed. Wean off your crutches as you feel comfortable with the brace on and locked.     FOLLOW UP VISIT: If you do not already have a follow-up visit scheduled, then please call [phone] to schedule one within 10-14 days.     **Please refer to patient post op info packet given to you at office visit for further details.

## 2020-09-02 NOTE — ASU DISCHARGE PLAN (ADULT/PEDIATRIC) - CARE PROVIDER_API CALL
Eugenio Berry  [facility]  [address]  [place] [address]  Phone: [phone]  Fax: [phone]  Follow Up Time:

## 2020-09-02 NOTE — BRIEF OPERATIVE NOTE - NSICDXBRIEFPROCEDURE_GEN_ALL_CORE_FT
PROCEDURES:  Arthroscopy of knee with reconstruction of medial patellofemoral ligament (MPFL) using hamstring graft 02-Sep-2020 21:09:10  Isacc Gonzalez

## 2020-09-03 ENCOUNTER — TRANSCRIPTION ENCOUNTER (OUTPATIENT)
Age: 30
End: 2020-09-03

## 2020-09-03 VITALS
SYSTOLIC BLOOD PRESSURE: 96 MMHG | HEART RATE: 73 BPM | RESPIRATION RATE: 18 BRPM | OXYGEN SATURATION: 99 % | DIASTOLIC BLOOD PRESSURE: 46 MMHG | TEMPERATURE: 99 F

## 2020-09-03 RX ADMIN — Medication 325 MILLIGRAM(S): at 09:54

## 2020-09-03 RX ADMIN — SODIUM CHLORIDE 3 MILLILITER(S): 9 INJECTION INTRAMUSCULAR; INTRAVENOUS; SUBCUTANEOUS at 05:59

## 2020-09-03 RX ADMIN — Medication 975 MILLIGRAM(S): at 06:20

## 2020-09-03 RX ADMIN — Medication 975 MILLIGRAM(S): at 07:19

## 2020-09-03 NOTE — PROGRESS NOTE ADULT - ASSESSMENT
A/P:  30y F s/p R Yumiko osteotomy/ MPFL reconstruction w/ lateral release.   -Pain control prn  - DVT ppx  - WBAT/PT/OOB as tolerated   - Med mgmt, continue home meds.  -Begin D/C planning if stable. A/P:  30y F s/p R Yumiko osteotomy/ MPFL reconstruction w/ lateral release. POD 1  -Pain control prn  - DVT ppx  - WBAT/PT/OOB as tolerated   - Med mgmt, continue home meds.  -Begin D/C planning if stable.

## 2020-09-03 NOTE — PHYSICAL THERAPY INITIAL EVALUATION ADULT - PERTINENT HX OF CURRENT PROBLEM, REHAB EVAL
R PFJ instability/subluxationwith painful anterior knee  since age 13 per patient ,failed conservative measures

## 2020-09-03 NOTE — PHYSICAL THERAPY INITIAL EVALUATION ADULT - GENERAL OBSERVATIONS, REHAB EVAL
long sitting in bed ,awake,alert,Ox4,pleasant & receptive ,eager to go home ,reports her mother will pick her up

## 2020-09-03 NOTE — PHYSICAL THERAPY INITIAL EVALUATION ADULT - ACTIVE RANGE OF MOTION EXAMINATION, REHAB EVAL
R knee in hinged knee brace locked at 0 degrees for ambulation on this encounter ; pt to begin CPM later today in home per MD and pt shown how to unlock brace (FLEX pre-set to 90 degrees by MD)/olivia. upper extremity Active ROM was WNL (within normal limits)/LLE Active ROM was WNL (within normal limits)/deficits as listed below/Right LE Active ROM was WFL (within functional limits)

## 2020-09-03 NOTE — PHYSICAL THERAPY INITIAL EVALUATION ADULT - MODALITIES TREATMENT COMMENTS
pt fitted with axillary crutches to 5'4" and instructed in CW WBAT RLE in hinged R knee brace locked at 0 degrees /full EXT.Instructed how to adjust straps of brace and unlock/lock hinge

## 2020-09-03 NOTE — DISCHARGE NOTE NURSING/CASE MANAGEMENT/SOCIAL WORK - PATIENT PORTAL LINK FT
You can access the FollowMyHealth Patient Portal offered by [facility] by registering at the following website: [email or web address]. By joining [facility]’s FollowMyHealth portal, you will also be able to view your health information using other applications (apps) compatible with our system.

## 2020-09-03 NOTE — PHYSICAL THERAPY INITIAL EVALUATION ADULT - PLANNED THERAPY INTERVENTIONS, PT EVAL
transfer training/cleared for DC home ,crutches issued ,has hinged knee brace and a CPM was reportedly delivered to home/orthotic fitting/training/gait training

## 2020-09-03 NOTE — PHYSICAL THERAPY INITIAL EVALUATION ADULT - GROSSLY INTACT, SENSORY
Grossly Intact/pt c/o pins & needles initially at anterior R leg below knee ,checked straps on brace to make sure not too tighy (negative) ,symptoms relieved with positional change out of bed (off buttocks)

## 2020-09-03 NOTE — PHYSICAL THERAPY INITIAL EVALUATION ADULT - MANUAL MUSCLE TESTING RESULTS, REHAB EVAL
R knee mm not tested,knee immobilized in full EXT in hinged Breg T-Scope brace/no strength deficits were identified

## 2020-09-03 NOTE — PROGRESS NOTE ADULT - ATTENDING COMMENTS
Orthopedic Sports Attending:  Agree with above resident/PA note.  Note edited where necessary.    Patient will follow up with me in 2 weeks. Appointment already set  Eugenio Berry, DO  Orthopaedic Surgery

## 2020-09-03 NOTE — PROGRESS NOTE ADULT - SUBJECTIVE AND OBJECTIVE BOX
POD 1 R janak osteotomy/ MPFL reconstruction w/ lateral release.   Pt seen and examined at the bedside. Pain is well controlled at this time. Pt reports feeling well, has worked with PT and is progressing well. Pt is aware of plan and agrees, no concerns at this time.     Vital Signs Last 24 Hrs  T(C): 36.7 (09-02-20 @ 22:30), Max: 37.7 (09-02-20 @ 22:00)  T(F): 98.1 (09-02-20 @ 22:30), Max: 99.9 (09-02-20 @ 22:00)  HR: 84 (09-02-20 @ 22:30) (63 - 84)  BP: 107/52 (09-02-20 @ 22:30) (99/43 - 120/75)  BP(mean): --  RR: 17 (09-02-20 @ 22:30) (14 - 20)  SpO2: 99% (09-02-20 @ 22:30) (99% - 100%)              Exam:  GEN: NAD, awake and alert.  RLE:  Dressing clean and dry in ace wrap and knee immobilizer.  +EHL FHL TA GS  SILT L2-S1  +DP  Calf soft and nontender, compartments soft and compressible. POD 1 R janak osteotomy/ MPFL reconstruction w/ lateral release.   Pt seen and examined at the bedside. Pain is well controlled at this time. Pt reports feeling well.     Vital Signs Last 24 Hrs  T(C): 36.7 (09-02-20 @ 22:30), Max: 37.7 (09-02-20 @ 22:00)  T(F): 98.1 (09-02-20 @ 22:30), Max: 99.9 (09-02-20 @ 22:00)  HR: 84 (09-02-20 @ 22:30) (63 - 84)  BP: 107/52 (09-02-20 @ 22:30) (99/43 - 120/75)  BP(mean): --  RR: 17 (09-02-20 @ 22:30) (14 - 20)  SpO2: 99% (09-02-20 @ 22:30) (99% - 100%)              Exam:  GEN: NAD, awake and alert.  RLE:  Dressing clean and dry in ace wrap and knee immobilizer.  +EHL FHL TA GS  SILT L2-S1  +DP  Calf soft and nontender, compartments soft and compressible.

## 2020-09-03 NOTE — PHYSICAL THERAPY INITIAL EVALUATION ADULT - CRITERIA FOR SKILLED THERAPEUTIC INTERVENTIONS
risk reduction/prevention/functional limitations in following categories/rehab potential/therapy frequency/anticipated discharge recommendation/predicted duration of therapy intervention/anticipated equipment needs at discharge/impairments found

## 2020-09-03 NOTE — PHYSICAL THERAPY INITIAL EVALUATION ADULT - ADL SKILLS, REHAB EVAL
CALLED AND SPOKE WITH PT TODAY. HE SAID HE WAS PLANNING ON GOING TODAY FOR PRE-OP TESTING: CBC,CMP, EKG, CXR.  HE HAS NOT SCHEDUELD THE CT PELVIS WITH IV AND RECTAL CONTRAST ONLY. HE WAS INFORMED TO CALL ME BACK WHEN IT IS SCHEDULE SO THAT I CAN OBTAINED AUTHORIZATION. HE IS AGREEABLE WITH THIS PLAN.  
independent

## 2020-09-14 ENCOUNTER — APPOINTMENT (OUTPATIENT)
Dept: ORTHOPEDIC SURGERY | Facility: CLINIC | Age: 30
End: 2020-09-14
Payer: COMMERCIAL

## 2020-09-14 PROCEDURE — 73560 X-RAY EXAM OF KNEE 1 OR 2: CPT | Mod: RT

## 2020-09-14 PROCEDURE — 99024 POSTOP FOLLOW-UP VISIT: CPT

## 2020-09-14 NOTE — HISTORY OF PRESENT ILLNESS
[___ Days Post Op] : post op day #[unfilled] [Clean/Dry/Intact] : clean, dry and intact [Neuro Intact] : an unremarkable neurological exam [Vascular Intact] : ~T peripheral vascular exam normal [Negative Israel's] : maneuvers demonstrated a negative Israel's sign [Xray (Date:___)] : [unfilled] Xray -  [1] : the patient reports pain that is 1/10 in severity [Chills] : no chills [Fever] : no fever [Nausea] : no nausea [Erythema] : not erythematous [Healed] : not healed [Vomiting] : no vomiting [Swelling] : not swollen [Dehiscence] : not dehisced [Discharge] : absent of discharge [de-identified] : s/p right knee arthroscopy with MPFL reconstruction with allograft. DOS: 9/2/20. [de-identified] : JERROD is a 30 year old female who presents today, 12 days s/p right knee arthroscopy with MPFL reconstruction with allograft and tibial osteotomy. Patient takes Tylenol every other day for mild pain.  She has not begun PT yet.  The patient denies numbness/tingling to the extremity. [de-identified] : The patient is A+Ox3, healthy appearing, and free of fever or chills. Examination reveals a well appearing surgical scar that is dry, clean and intact. There is no erythema but there is moderate effusion without warmth and mild ecchymosis throughout the leg. Knee motion is -2 - 90 degrees of passive ROM, straight leg raise with extension lag and pain free. Weakened quad strength. Full sensation intact and dorsalis pedis pulses 2+.\par \par Straight leg raise with lag\par \par Special Tests: NEG Lachman, NEG anterior drawer, NEG posterior drawer with collateral testing and varus/valgus normal. [de-identified] : 2 views of the right knee were performed today and available for me to review. They demonstrate good alignment with no fracture. No dislocation. No other deformities. [de-identified] : JERROD is a 30 year female who is doing extremely well and is performing PT 2x/week without complaints. Surgical sites are clean and dry without warmth or erythema, pt denies fever or chills. She is to continue PT for 4 more weeks until we see her again for re-assessment at the 2nd post-op appointment. May continue to use tylenol prn for pain. Due to lag on exam today she is to continue with her knee immobilizer in full extension with all weightbearing activities. She may unlock it up to 90 degrees with nonweightbearing activity and will progress with PT as they feel necessary. She agrees to the latter plan and does not have any further questions or concerns.

## 2020-09-16 ENCOUNTER — NON-APPOINTMENT (OUTPATIENT)
Age: 30
End: 2020-09-16

## 2020-09-24 ENCOUNTER — APPOINTMENT (OUTPATIENT)
Dept: ORTHOPEDIC SURGERY | Facility: CLINIC | Age: 30
End: 2020-09-24
Payer: COMMERCIAL

## 2020-09-24 VITALS
DIASTOLIC BLOOD PRESSURE: 77 MMHG | HEIGHT: 62 IN | BODY MASS INDEX: 23.19 KG/M2 | SYSTOLIC BLOOD PRESSURE: 115 MMHG | HEART RATE: 97 BPM | WEIGHT: 126 LBS

## 2020-09-24 DIAGNOSIS — L08.9 LOCAL INFECTION OF THE SKIN AND SUBCUTANEOUS TISSUE, UNSPECIFIED: ICD-10-CM

## 2020-09-24 PROCEDURE — 99024 POSTOP FOLLOW-UP VISIT: CPT

## 2020-09-24 RX ORDER — CEPHALEXIN 500 MG/1
500 CAPSULE ORAL 3 TIMES DAILY
Qty: 30 | Refills: 0 | Status: COMPLETED | COMMUNITY
Start: 2020-09-24 | End: 2020-10-04

## 2020-09-24 NOTE — HISTORY OF PRESENT ILLNESS
[___ Weeks Post Op] : [unfilled] weeks post op [1] : the patient reports pain that is 1/10 in severity [Clean/Dry/Intact] : clean, dry and intact [Neuro Intact] : an unremarkable neurological exam [Vascular Intact] : ~T peripheral vascular exam normal [Negative Israel's] : maneuvers demonstrated a negative Israel's sign [Xray (Date:___)] : [unfilled] Xray -  [Chills] : no chills [Fever] : no fever [Nausea] : no nausea [Vomiting] : no vomiting [Healed] : not healed [Erythema] : not erythematous [Discharge] : absent of discharge [Swelling] : not swollen [Dehiscence] : not dehisced [de-identified] : s/p right knee arthroscopy with MPFL reconstruction with allograft. DOS: 9/2/20. [de-identified] : JERROD is a 30 year old female who presents today, 4 weeks s/p right knee arthroscopy with MPFL reconstruction with allograft and tibial osteotomy. Patient takes Tylenol every other day for mild pain.  She has began PT since last seeing us on 9/14 with home therapy. She reports discharge from lower portion of her incision sites of which patient states her therapists told her was normal. She proceeded to continue with ROM exrecises with PT and noted everytime she went she would see more discharge. She comes in with her marine brace locked in extension with one strap across the incision site that is open. The patient denies numbness/tingling to the extremity, fever or chills. She admits to showering daily and changing her dressings daily. [de-identified] : The patient is A+Ox3, healthy appearing, and free of fever or chills. Examination reveals well appearing arthroscopic surgical scars. The open scar to her anterior knee proximally is dry and intact. The distal portion is open superficially with small pus drainage from the most distal portion. Upon probing this area, it is not deep. There is no surrounding erythema or warmth.  There is moderate effusion without ecchymosis throughout the leg. Knee motion is -5 - 90 degrees of passive ROM, straight leg raise with extension lag and pain free. Weakened quad strength. Full sensation intact and dorsalis pedis pulses 2+.\par \par Straight leg raise with lag\par \par Special Tests: NEG Lachman, NEG anterior drawer, NEG posterior drawer with collateral testing and varus/valgus normal. [de-identified] : No new imaging today [de-identified] : ANY is a 30 year old female who presents today, 4 weeks s/p right knee arthroscopy with MPFL reconstruction with allograft and tibial osteotomy. Patient takes Tylenol every other day for mild pain.  She has began PT since last seeing us on 9/14 with home therapy. She reports discharge from lower portion of her incision sites of which patient states her therapists told her was normal. She proceeded to continue with ROM exrecises with PT and noted everytime she went she would see more discharge. She comes in with her marine brace locked in extension with one strap across the incision site that is open. The patient denies numbness/tingling to the extremity, fever or chills. She admits to showering daily and changing her dressings daily.\par \par Due to lag on exam today as well as her incision site opening, I would like her to remain within the knee immobilizer in full extension with all  activities including sleeping. I also want her to postpone PT for now until she sees us again in 1 week from today. Her dressing was removed and replaced after betadine wash was performed. I showed patient how to perform these on her own at home as well with daily dressing changes. She will shower daily keeping the area clean/dry and use a sterile gauze to dry the area and was educated not to re-use it. I would like her to keep the incision sites open while she is at home. She will work on extension exercises at home daily until we see her but no flexion at the time. Her brace was adjusted today to lesson the pressure of the strap to this area as well and pt reports that it is more comfortable at this time.  She will call us and see us sooner if a fever occurs or surrounding erythema. She was given Keflex to take TID with food x 10 days. If after 5 days she experiences GI symptoms she may stop the medication. She will see wound care weekly  as well. She agrees to the latter plan and does not have any further questions or concerns.

## 2020-10-01 ENCOUNTER — APPOINTMENT (OUTPATIENT)
Dept: ORTHOPEDIC SURGERY | Facility: CLINIC | Age: 30
End: 2020-10-01
Payer: COMMERCIAL

## 2020-10-01 PROCEDURE — 99024 POSTOP FOLLOW-UP VISIT: CPT

## 2020-10-01 NOTE — HISTORY OF PRESENT ILLNESS
[___ Months Post Op] : [unfilled] months post op [de-identified] : s/p right knee arthroscopy with MPFL reconstruction with allograft. DOS: 9/2/20.  [de-identified] : JERROD is a 30 year old female who presents today, 4 weeks s/p right knee arthroscopy with MPFL reconstruction with allograft and tibial osteotomy. Patient takes Tylenol every other day for mild pain. She has began PT since last seeing us on 9/14 with home therapy. She reports discharge from lower portion of her incision sites of which patient states her therapists told her was normal. She proceeded to continue with ROM exrecises with PT and noted everytime she went she would see more discharge. She comes in with her marine brace locked in extension with one strap across the incision site that is open. The patient denies numbness/tingling to the extremity, fever or chills. She admits to showering daily and changing her dressings daily. the patient reports pain that is 1/10 in severity. Current symptoms include no chills, no fever, no nausea and no vomiting.  [de-identified] : The patient is A+Ox3, healthy appearing, and free of fever or chills. Examination reveals well appearing arthroscopic surgical scars. The open scar to her anterior knee proximally is dry and intact. The distal portion is open superficially with small pus drainage from the most distal portion. Upon probing this area, it is not deep. There is no surrounding erythema or warmth. There is moderate effusion without ecchymosis throughout the leg. Knee motion is -5 - 90 degrees of passive ROM, straight leg raise with extension lag and pain free. Weakened quad strength. Full sensation intact and dorsalis pedis pulses 2+.\par \par Straight leg raise with lag\par \par Special Tests: NEG Lachman, NEG anterior drawer, NEG posterior drawer with collateral testing and varus/valgus normal. The surgical incision site(s) was not healed, but clean, dry and intact, not erythematous, absent of discharge, not swollen and not dehisced. Additional findings included an unremarkable neurological exam, peripheral vascular exam normal and maneuvers demonstrated a negative Israel's sign. \par \par Imagin/14/ Xray - . No new imaging today.  [de-identified] : Is a 30-year-old female status post right tibial tubercle osteotomy MPFL repair lateral lengthening.  It appears that her wounds are closing up well.  No signs of infection.  No erythema.  Patient denies fevers or chills.  At this time I recommend continued dressing changes daily.  I will see her back in 10 days for clinical reassessment as well as assessment of the wound.  She agrees with above plan all questions were answered.

## 2020-10-12 ENCOUNTER — APPOINTMENT (OUTPATIENT)
Dept: ORTHOPEDIC SURGERY | Facility: CLINIC | Age: 30
End: 2020-10-12
Payer: COMMERCIAL

## 2020-10-12 PROCEDURE — 99024 POSTOP FOLLOW-UP VISIT: CPT

## 2020-10-12 NOTE — HISTORY OF PRESENT ILLNESS
[___ Months Post Op] : [unfilled] months post op [de-identified] : s/p right knee arthroscopy with MPFL reconstruction with allograft. DOS: 9/2/2020 [de-identified] : JERROD is a 30 year old female who presents today, 5 1/2 weeks s/p right knee arthroscopy with MPFL reconstruction with allograft and tibial osteotomy. Patient takes Tylenol every other day for mild pain. She is here for wound check and admits to improvements without discharge. She completed Keflex TID x 10 days and continues to use topical bacitracin. She held off on PT but performs her HEP daily. Denies fever/ chills. [de-identified] : The patient is A+Ox3, healthy appearing, and free of fever or chills. Examination reveals well appearing arthroscopic surgical scars. The open scar to her anterior knee proximally is dry and intact. The distal portion is now dry and intact as well small without pus drainage from the most distal portion. There is no surrounding erythema or warmth. There is moderate effusion without ecchymosis throughout the leg. Knee motion is -5 - 90 degrees of passive ROM, straight leg raise with extension lag and pain free. Weakened quad strength. Full sensation intact and dorsalis pedis pulses 2+.\par \par Straight leg raise with lag\par \par Special Tests: NEG Lachman, NEG anterior drawer, NEG posterior drawer with collateral testing and varus/valgus normal. The surgical incision site(s) was not healed, but clean, dry and intact, not erythematous, absent of discharge, not swollen and not dehisced. Additional findings included an unremarkable neurological exam, peripheral vascular exam normal and maneuvers demonstrated a negative Israel's sign.  [de-identified] : JERROD is a 30 year old female who presents today, 5 1/2 weeks s/p right knee arthroscopy with MPFL reconstruction with allograft and tibial osteotomy. Patient takes Tylenol every other day for mild pain. She is here for wound check and admits to improvements without discharge. She completed Keflex TID x 10 days and continues to use topical bacitracin. She held off on PT but performs her HEP daily. Denies fever/ chills.\par \par At this time, he wound has healed and is doing well. She will restart formal PT and see us if the incision opens again or if there are any signs of infection. She will remain NWB with marine locked in extenstion and use of her crutches until we see her in 4 weeks for clinical reassessment. She agrees with the above plan and all questions were answered.

## 2020-11-09 ENCOUNTER — APPOINTMENT (OUTPATIENT)
Dept: ORTHOPEDIC SURGERY | Facility: CLINIC | Age: 30
End: 2020-11-09
Payer: COMMERCIAL

## 2020-11-09 PROCEDURE — 99024 POSTOP FOLLOW-UP VISIT: CPT

## 2020-11-11 NOTE — HISTORY OF PRESENT ILLNESS
[___ Months Post Op] : [unfilled] months post op [de-identified] : s/p right knee arthroscopy with MPFL reconstruction with allograft. DOS: 9/2/2020 [de-identified] : JERROD is a 30 year old female who presents today, 9 1/2 weeks s/p right knee arthroscopy with MPFL reconstruction with allograft and tibial osteotomy. Patient takes Tylenol every other day for mild pain. She is here for wound check and admits to improvements without discharge. She completed Keflex TID x 10 days and continues to use topical bacitracin. She held off on PT but performs her HEP daily. Denies fever/ chills. [de-identified] : Right Knee\par \par The patient is A+Ox3, healthy appearing, and free of fever or chills. Examination reveals well appearing arthroscopic surgical scars. The open scar to her anterior knee proximally is dry and intact. The distal portion is now dry and intact as well small without pus drainage from the most distal portion. There is no surrounding erythema or warmth. There is moderate effusion without ecchymosis throughout the leg. Knee motion is 0- - 140 degrees of passive ROM, straight leg raise without extension lag and pain free. Weakened quad strength. Full sensation intact and dorsalis pedis pulses 2+.\par \par Special Tests: NEG Lachman, NEG anterior drawer, NEG posterior drawer with collateral testing and varus/valgus normal. The surgical incision site(s) was not healed, but clean, dry and intact, not erythematous, absent of discharge, not swollen and not dehisced. Additional findings included an unremarkable neurological exam, peripheral vascular exam normal and maneuvers demonstrated a negative Israel's sign.  [de-identified] : JERROD is a 30 year old female who presents today, 9 1/2 weeks s/p right knee arthroscopy with MPFL reconstruction with allograft and tibial osteotomy. She completed Keflex TID x 10 days. Pt admits to PT 2x/week. Denies fever/ chills. She denies the incision sites opening since restarting PT. Denies any numbness/tingling.\par \par She will continue to progress with PT 2x/week and may continue to be WBAT. We will see her in 2 months for clinical reassessment. She agrees with the above plan and all questions were answered.

## 2020-12-21 ENCOUNTER — APPOINTMENT (OUTPATIENT)
Dept: ORTHOPEDIC SURGERY | Facility: CLINIC | Age: 30
End: 2020-12-21
Payer: COMMERCIAL

## 2020-12-21 PROCEDURE — 99072 ADDL SUPL MATRL&STAF TM PHE: CPT

## 2020-12-21 PROCEDURE — 73564 X-RAY EXAM KNEE 4 OR MORE: CPT | Mod: RT

## 2020-12-21 PROCEDURE — 99213 OFFICE O/P EST LOW 20 MIN: CPT

## 2020-12-28 NOTE — HISTORY OF PRESENT ILLNESS
[___ Months Post Op] : [unfilled] months post op [de-identified] : s/p right knee arthroscopy with MPFL reconstruction with allograft. DOS: 9/2/2020 [de-identified] : JERROD is a 30 year old female who presents today, 3 1/2 months s/p right knee arthroscopy with MPFL reconstruction with allograft and tibial osteotomy. Patient has completed PT.  She has been unable to gain full knee flexion.  Patient no longer notes discharge from the incision site.  She denies warmth, redness or swelling.  She notes slight pain with driving and with sitting for prolonged periods of time.   [de-identified] : Right Knee\par \par The patient is A+Ox3, healthy appearing, and free of fever or chills. Examination reveals well appearing arthroscopic surgical scars. The open scar to her anterior knee proximally is dry and intact. The distal portion is now dry and intact as well small without pus drainage from the most distal portion. There is no surrounding erythema or warmth. There is moderate effusion without ecchymosis throughout the leg. Knee motion is 0- - 140 degrees of passive ROM, straight leg raise without extension lag and pain free. Weakened quad strength. Full sensation intact and dorsalis pedis pulses 2+.\par \par Special Tests: NEG Lachman, NEG anterior drawer, NEG posterior drawer with collateral testing and varus/valgus normal. The surgical incision site(s) was not healed, but clean, dry and intact, not erythematous, absent of discharge, not swollen and not dehisced. Additional findings included an unremarkable neurological exam, peripheral vascular exam normal and maneuvers demonstrated a negative Israel's sign.  [de-identified] : 4 views of the right knee were performed today and available for me to review. They demonstrate good alignment with no fracture. No dislocation. No other deformities.  [de-identified] : JERROD is a 30 year old female who presents today, 3-1/2-month status post right tibial tubercle osteotomy with MPFL reconstruction.  Overall she is doing extremely well she is already graduated from physical therapy.  She does have some mild weakness in quadriceps activity.  At this time I recommend home exercise program.  I provided her with a workout packet that discusses knee exercises was applied as well as rehab plans.  I will see her back in 3 months for clinical reassessment.  With regards to work she may return to work on January 2.  She may not stand for work and will be allowed a seat as tolerated.  She agrees with the above plan all questions were answered.

## 2021-03-15 ENCOUNTER — APPOINTMENT (OUTPATIENT)
Dept: ORTHOPEDIC SURGERY | Facility: CLINIC | Age: 31
End: 2021-03-15
Payer: COMMERCIAL

## 2021-03-15 VITALS
HEIGHT: 62 IN | SYSTOLIC BLOOD PRESSURE: 124 MMHG | WEIGHT: 126 LBS | OXYGEN SATURATION: 100 % | DIASTOLIC BLOOD PRESSURE: 78 MMHG | HEART RATE: 58 BPM | BODY MASS INDEX: 23.19 KG/M2

## 2021-03-15 PROCEDURE — 99213 OFFICE O/P EST LOW 20 MIN: CPT

## 2021-03-15 PROCEDURE — 99072 ADDL SUPL MATRL&STAF TM PHE: CPT

## 2021-03-15 NOTE — DISCUSSION/SUMMARY
[de-identified] : Sherice is a 30-year-old female status post right tubercle osteotomy with MPFL reconstruction for chronic recurrent patellar instability.  Overall she is doing very well.  She has not done any physical therapy and is back at work full-time.  Working 12 hours.  At this time I recommended formal physical therapy.  I also recommend that she returns to work with sitting down more than 6 hours or more than half of her work hours.  I will see her back in 4 to 6 weeks for clinical reassessment.  She agrees with above plan all questions were answered.

## 2021-03-15 NOTE — REASON FOR VISIT
[Follow-Up Visit] : a follow-up visit for [Aftercare Following Surgery] : aftercare following surgery [FreeTextEntry2] : s/p right knee arthroscopy with MPFL reconstruction with allograft. DOS: 9/2/2020.

## 2021-03-15 NOTE — HISTORY OF PRESENT ILLNESS
[Stable] : stable [___ mths] : [unfilled] month(s) ago [2] : an average pain level of 2/10 [Bending] : worsened by bending [Walking] : worsened by walking [de-identified] : JERROD is a 30 year old female who presents today, 6 months s/p right knee arthroscopy with MPFL reconstruction with allograft and tibial osteotomy. Patient has completed PT. She has been unable to gain full knee flexion. Patient no longer notes discharge from the incision site. She denies warmth, redness or swelling. She notes slight pain with driving and with sitting for prolonged periods of time.\par \par Currently, patient reports pain over incision site and gastroc. She endorses feelings of knee instability specially at the end of the day. She has not done any formal PT. She has not performed any HEP. Patient denies numbness and tingling to the extremities. She denies use of OTC pain medications. She has returned to work at this time.

## 2021-03-15 NOTE — PHYSICAL EXAM
[de-identified] : Physical Exam:\par General: Well appearing, no acute distress, A&O\par Neurologic: A&Ox3, No focal deficits\par Head: NCAT without abrasions, lacerations, or ecchymosis to head, face, or scalp\par Eyes: No scleral icterus, no gross abnormalities\par Respiratory: Equal chest wall expansion bilaterally, no accessory muscle use\par Lymphatic: No lymphadenopathy palpated\par Skin: Warm and dry\par Psychiatric: Normal mood and affect\par \par Right lower extremity: Incision sites clean dry intact.  No surrounding erythema.  No wound dehiscence.  Range of motion of knee is full.  No patellar instability noted at 0 36 to 90 degrees of flexion.  No J sign noted.  No tenderness over the osteotomy site.  Tenderness over the quad muscle.  Tenderness over the upper gastroc muscle.  No full calf tenderness.  Motor and sensation intact L4-S1.  Dorsalis pedis 2+

## 2021-03-15 NOTE — REVIEW OF SYSTEMS
[FreeTextEntry9] :  s/p right knee arthroscopy with MPFL reconstruction with allograft. DOS: 9/2/2020.

## 2021-05-10 ENCOUNTER — APPOINTMENT (OUTPATIENT)
Dept: ORTHOPEDIC SURGERY | Facility: CLINIC | Age: 31
End: 2021-05-10
Payer: COMMERCIAL

## 2021-05-10 VITALS
SYSTOLIC BLOOD PRESSURE: 111 MMHG | DIASTOLIC BLOOD PRESSURE: 75 MMHG | HEIGHT: 62 IN | HEART RATE: 77 BPM | WEIGHT: 126 LBS | BODY MASS INDEX: 23.19 KG/M2

## 2021-05-10 PROCEDURE — 99214 OFFICE O/P EST MOD 30 MIN: CPT

## 2021-05-10 PROCEDURE — 99072 ADDL SUPL MATRL&STAF TM PHE: CPT

## 2021-05-10 RX ORDER — NAPROXEN 500 MG/1
500 TABLET ORAL
Qty: 28 | Refills: 0 | Status: DISCONTINUED | COMMUNITY
Start: 2020-11-09 | End: 2021-05-10

## 2021-05-10 NOTE — ADDENDUM
[FreeTextEntry1] : Documented by Lloyd Archibald acting as a scribe for Dr. Berry on 05/10/2021. \par \par All medical record entries made by the Scribe were at my, Dr. Berry's, direction and\par personally dictated by me on 05/10/2021. I have reviewed the chart and agree that the record\par accurately reflects my personal performance of the history, physical exam, procedure and imaging.

## 2021-05-10 NOTE — HISTORY OF PRESENT ILLNESS
[Stable] : stable [___ mths] : [unfilled] month(s) ago [2] : an average pain level of 2/10 [Bending] : worsened by bending [Walking] : worsened by walking [de-identified] : JERROD is a 30 year old female who presents today s/p right knee arthroscopy with MPFL reconstruction with allograft and tibial osteotomy, 8 mos ago. Currently, she reports no R knee pain. She reports d/c PT on 04/15/2021. She reports some pain when she works more than 10 hours at work. No other symptoms or complaints reported that this time.

## 2021-05-10 NOTE — DISCUSSION/SUMMARY
[de-identified] : EJRROD is a 30 year old female who presents today s/p right knee arthroscopy with MPFL reconstruction with allograft and tibial osteotomy, 8 mos ago. Currently, she reports no R knee pain. She reports d/c PT on 04/15/2021. She reports some pain when she works more than 10 hours at work. No other symptoms or complaints reported that this time.\par \par We had a thorough discussion regarding the nature of her pain, the pathophysiology, as well as all treatment options. At this point, patient is doing very well. I recommend that pt transition into normal lifestyle; however, I do recommend she takes it lightly with leg extension exercise. Patient will follow up in 4 months for repeat clinical assessment. All questions were answered and the patient verbalized understanding. The patient is in agreement with this treatment plan.

## 2021-05-10 NOTE — PHYSICAL EXAM
[de-identified] : Physical Exam:\par General: Well appearing, no acute distress, A&O\par Neurologic: A&Ox3, No focal deficits\par Head: NCAT without abrasions, lacerations, or ecchymosis to head, face, or scalp\par Eyes: No scleral icterus, no gross abnormalities\par Respiratory: Equal chest wall expansion bilaterally, no accessory muscle use\par Lymphatic: No lymphadenopathy palpated\par Skin: Warm and dry\par Psychiatric: Normal mood and affect\par \par Right lower extremity: Incision sites clean dry intact.  No surrounding erythema.  No wound dehiscence.  Range of motion of knee is full.  No patellar instability noted at 0 36 to 90 degrees of flexion.  No J sign noted.  No tenderness over the osteotomy site.  Tenderness over the quad muscle.  No Tenderness over the upper gastroc muscle.  No full calf tenderness.  Motor and sensation intact L4-S1.  Dorsalis pedis 2+.

## 2021-05-10 NOTE — REASON FOR VISIT
[Follow-Up Visit] : a follow-up visit for [Aftercare Following Surgery] : aftercare following surgery [FreeTextEntry2] : s/p right knee arthroscopy with MPFL reconstruction with allograft. DOS: 9/2/2020

## 2021-05-10 NOTE — REVIEW OF SYSTEMS
[Negative] : Heme/Lymph [FreeTextEntry9] :  s/p right knee arthroscopy with MPFL reconstruction with allograft. DOS: 9/2/2020.

## 2021-09-20 ENCOUNTER — APPOINTMENT (OUTPATIENT)
Dept: ORTHOPEDIC SURGERY | Facility: CLINIC | Age: 31
End: 2021-09-20
Payer: COMMERCIAL

## 2021-09-20 DIAGNOSIS — S83.001A UNSPECIFIED SUBLUXATION OF RIGHT PATELLA, INITIAL ENCOUNTER: ICD-10-CM

## 2021-09-20 DIAGNOSIS — Z98.890 OTHER SPECIFIED POSTPROCEDURAL STATES: ICD-10-CM

## 2021-09-20 DIAGNOSIS — M22.41 CHONDROMALACIA PATELLAE, RIGHT KNEE: ICD-10-CM

## 2021-09-20 PROCEDURE — 99213 OFFICE O/P EST LOW 20 MIN: CPT

## 2021-09-20 NOTE — ADDENDUM
[FreeTextEntry1] : Documented by Lloyd Archibald acting as a scribe for Dr. Berry on 09/20/2021. \par \par All medical record entries made by the Scribe were at my, Dr. Berry's, direction and\par personally dictated by me on 09/20/2021. I have reviewed the chart and agree that the record\par accurately reflects my personal performance of the history, physical exam, procedure and imaging.

## 2021-09-20 NOTE — HISTORY OF PRESENT ILLNESS
[de-identified] : JERROD is a 30 year old female who presents today s/p right knee arthroscopy with MPFL reconstruction with allograft and tibial osteotomy, 1 year ago. Currently, she reports no R knee pain. She hikes every weekend without pain. She reports d/c PT on 04/15/2021. She reports some pain when she works more than 10 hours at work. No other symptoms or complaints reported that this time. She is happy with her progress.

## 2021-09-20 NOTE — DISCUSSION/SUMMARY
[de-identified] : JERROD is a 30 year old female who presents today s/p right knee arthroscopy with MPFL reconstruction with allograft and tibial osteotomy, 1 year ago. Currently, she reports no R knee pain. She hikes every weekend without pain. She reports d/c PT on 04/15/2021. She reports some pain when she works more than 10 hours at work. No other symptoms or complaints reported that this time. She is happy with her progress.\par \par We had a thorough discussion regarding the nature of her pain, the pathophysiology, as well as all treatment options. At this point, patient is doing very well. I recommend that pt continues to transition into normal lifestyle. We will see her at the two year beto, sooner prn. All questions were answered and the patient verbalized understanding. The patient is in agreement with this treatment plan.

## 2021-09-20 NOTE — PHYSICAL EXAM
[de-identified] : Physical Exam:\par General: Well appearing, no acute distress, A&O\par Neurologic: A&Ox3, No focal deficits\par Head: NCAT without abrasions, lacerations, or ecchymosis to head, face, or scalp\par Eyes: No scleral icterus, no gross abnormalities\par Respiratory: Equal chest wall expansion bilaterally, no accessory muscle use\par Lymphatic: No lymphadenopathy palpated\par Skin: Warm and dry\par Psychiatric: Normal mood and affect\par \par Right lower extremity: Incision sites clean dry intact.  No surrounding erythema.  No wound dehiscence.  Well healed incision scars. Range of motion of knee is full.  No patellar instability noted at 0  to 90 degrees of flexion.  No J sign noted.  No tenderness over the osteotomy site.  No tenderness over the quad muscle.  No Tenderness over the upper gastroc muscle.  No full calf tenderness.  Motor and sensation intact L4-S1.  Dorsalis pedis 2+. \par \par \par Left Knee: Range of Motion in Degrees	\par 	  Claimant:	Normal:	\par Flexion Active	 135 	 135-degrees	\par Flexion Passive	 135	 135-degrees	\par Extension Active	 0-5	 0-5-degrees	\par Extension Passive	 0-5	 0-5-degrees	\par \par Mild patellar translation noted at 2 degree flexion. No weakness to flexion/extension. No evidence of instability in the AP plane or varus or valgus stress. Negative Lachman. Negative pivot shift. Negative anterior drawer test. Negative posterior drawer test. Negative Lesia. Negative Apley grind. Negative Jesus test. No medial or lateral joint line tenderness. No tenderness over the medial and lateral facet of the patella. No patellofemoral crepitations. No lateral tilting patella. No patellar apprehension. No crepitation in the medial and lateral femoral condyle. No proximal or distal swelling, edema or tenderness. No gross motor or sensory deficits. No intra-articular swelling. 2+ DP and PT pulses. No varus or valgus malalignment. Skin is intact. No rashes, scars or lesions.

## 2023-03-06 NOTE — ASU PREOP CHECKLIST - WEIGHT IN LBS
How Severe Is Your Skin Lesion?: mild Has Your Skin Lesion Been Treated?: not been treated Is This A New Presentation, Or A Follow-Up?: Growth 128

## 2023-08-02 NOTE — ED ADULT TRIAGE NOTE - SPO2 (%)
History of bilateral pulmonary emboli in segmental and subsegmental branches on the R and subsegmental branches on the L, diagnosed on CT chest during ED visit on 4/7/23. Home meds: eliquis 5mg BID  - c/w full dose AC
99

## 2023-09-09 ENCOUNTER — NON-APPOINTMENT (OUTPATIENT)
Age: 33
End: 2023-09-09

## 2023-09-11 ENCOUNTER — RESULT REVIEW (OUTPATIENT)
Age: 33
End: 2023-09-11

## 2023-09-11 ENCOUNTER — OUTPATIENT (OUTPATIENT)
Dept: OUTPATIENT SERVICES | Facility: HOSPITAL | Age: 33
LOS: 1 days | End: 2023-09-11
Payer: COMMERCIAL

## 2023-09-11 ENCOUNTER — APPOINTMENT (OUTPATIENT)
Dept: RADIOLOGY | Facility: CLINIC | Age: 33
End: 2023-09-11
Payer: COMMERCIAL

## 2023-09-11 DIAGNOSIS — Z90.89 ACQUIRED ABSENCE OF OTHER ORGANS: Chronic | ICD-10-CM

## 2023-09-11 DIAGNOSIS — M25.572 PAIN IN LEFT ANKLE AND JOINTS OF LEFT FOOT: ICD-10-CM

## 2023-09-11 PROCEDURE — 73610 X-RAY EXAM OF ANKLE: CPT

## 2023-09-11 PROCEDURE — 73610 X-RAY EXAM OF ANKLE: CPT | Mod: 26,LT
